# Patient Record
Sex: FEMALE | Race: WHITE | Employment: FULL TIME | URBAN - METROPOLITAN AREA
[De-identification: names, ages, dates, MRNs, and addresses within clinical notes are randomized per-mention and may not be internally consistent; named-entity substitution may affect disease eponyms.]

---

## 2021-12-20 ENCOUNTER — OFFICE VISIT (OUTPATIENT)
Dept: ORTHOPEDIC SURGERY | Age: 36
End: 2021-12-20
Payer: COMMERCIAL

## 2021-12-20 DIAGNOSIS — S82.002A CLOSED NONDISPLACED FRACTURE OF LEFT PATELLA, UNSPECIFIED FRACTURE MORPHOLOGY, INITIAL ENCOUNTER: Primary | ICD-10-CM

## 2021-12-20 PROCEDURE — 27520 TREAT KNEECAP FRACTURE: CPT | Performed by: ORTHOPAEDIC SURGERY

## 2021-12-20 PROCEDURE — 99203 OFFICE O/P NEW LOW 30 MIN: CPT | Performed by: ORTHOPAEDIC SURGERY

## 2021-12-21 NOTE — PROGRESS NOTES
fracture    Plan:  We discussed the diagnosis and treatment options. I recommended continued nonoperative management of this injury. She may continue to be weightbearing as tolerated but I recommended she avoid deep knee flexion activities such as squats and high impact activity such as jumping or running. She will continue using NSAIDs as needed for pain control. She will follow up here in 3 to 4 weeks for repeat radiographs and assessment. Total time spent on today's encounter was at least 32 minutes. This time included reviewing prior notes, radiographs, and lab results when available, reviewing history obtained by medical assistant, performing history and physical exam, reviewing tests/radiographs with the patient, counseling the patient, ordering medications or tests, documentation in the electronic health record, and coordination of care. This dictation was done with Dragon dictation and may contain mechanical errors related to translation.

## 2022-01-17 ENCOUNTER — OFFICE VISIT (OUTPATIENT)
Dept: ORTHOPEDIC SURGERY | Age: 37
End: 2022-01-17

## 2022-01-17 VITALS — BODY MASS INDEX: 23.54 KG/M2 | WEIGHT: 150 LBS | HEIGHT: 67 IN | RESPIRATION RATE: 18 BRPM

## 2022-01-17 DIAGNOSIS — S82.002A CLOSED NONDISPLACED FRACTURE OF LEFT PATELLA, UNSPECIFIED FRACTURE MORPHOLOGY, INITIAL ENCOUNTER: Primary | ICD-10-CM

## 2022-01-17 PROCEDURE — 99024 POSTOP FOLLOW-UP VISIT: CPT | Performed by: PHYSICIAN ASSISTANT

## 2022-01-17 NOTE — PROGRESS NOTES
Subjective:      Patient ID: Zbigniew Soares is a 31 y.o. female who is here for follow up evaluation of left knee, nondisplaced inferior pole of the patella fracture. Date of injury 12/19/2021. She states that her pain is better. Pain scale today 2/10 VAS. She states she has been following restrictions with holding deep knee bends. Review Of Systems:   Negative for fever or chills. Negative for numbness or tingling in the affected extremity. Past Medical History:   Diagnosis Date    Acid reflux        History reviewed. No pertinent family history. History reviewed. No pertinent surgical history. Social History     Occupational History    Not on file   Tobacco Use    Smoking status: Never Smoker    Smokeless tobacco: Never Used   Substance and Sexual Activity    Alcohol use: Yes    Drug use: Never    Sexual activity: Not on file       No current outpatient medications on file. No current facility-administered medications for this visit. Objective:     She is alert, oriented x 3, pleasant, well nourished, developed and in no   acute distress. Resp 18   Ht 5' 7\" (1.702 m)   Wt 150 lb (68 kg)   BMI 23.49 kg/m²      Examination of the left knee:   The overall alignment of the knee is neutral.  Gait is normal.  There is no intra-articular effusion. There is no pain associated with ROM testing. Minimal tenderness over the inferior pole of the patella. Extensor Mechanism is intact. X Rays: performed in the office today:   AP, lateral and oblique x-ray of the left knee:  Healing inferior pole patella fracture. No displacement. Diagnosis:       ICD-10-CM    1. Closed nondisplaced fracture of left patella, unspecified fracture morphology, initial encounter  S82.002A XR KNEE LEFT (1-2 VIEWS)        Assessment and Plan:       Assessment:  Healing closed nondisplaced fracture inferior pole patella. There is been no displacement.       I had an extensive discussion with Ms. Itzel Munoz regarding the natural history, etiology, and long term consequences of her condition. I have presented reasonable alternatives to the patient's proposed care, treatment, and services. Risks and benefits of the treatment options also reviewed in detail. I have outlined a treatment plan with them. She has had full opportunity to ask her questions. I have answered them all to her satisfaction. I feel that Ms. Itzel Munoz understands our discussion today. Plan:  She will continue to avoid deep knee bends for at least another 2 to 3 weeks. After that she has no restrictions. Follow up:   Call or return to clinic if these symptoms worsen or fail to improve as anticipated. Jovani Tiwari PA-C   Senior Physician Assistant   [facility]                                         Disclaimer: This note was generated with use of a verbal recognition program (DRAGON) and an attempt was made to check for errors. It is possible that there are still dictated errors within this office note. If so, please bring any significant errors to my attention for an addendum. All efforts were made to ensure that this office note is accurate.